# Patient Record
Sex: FEMALE | Race: WHITE | NOT HISPANIC OR LATINO | Employment: OTHER | URBAN - METROPOLITAN AREA
[De-identification: names, ages, dates, MRNs, and addresses within clinical notes are randomized per-mention and may not be internally consistent; named-entity substitution may affect disease eponyms.]

---

## 2017-06-30 ENCOUNTER — ALLSCRIPTS OFFICE VISIT (OUTPATIENT)
Dept: OTHER | Facility: OTHER | Age: 82
End: 2017-06-30

## 2018-01-12 ENCOUNTER — GENERIC CONVERSION - ENCOUNTER (OUTPATIENT)
Dept: OTHER | Facility: OTHER | Age: 83
End: 2018-01-12

## 2018-01-12 ENCOUNTER — ALLSCRIPTS OFFICE VISIT (OUTPATIENT)
Dept: OTHER | Facility: OTHER | Age: 83
End: 2018-01-12

## 2018-01-13 NOTE — PROGRESS NOTES
Assessment   1  Actinic keratosis (702 0) (L57 0)   2  Screening for skin condition (V82 0) (Z13 89)   3  Seborrheic keratosis (702 19) (L82 1)   4  History of nonmelanoma skin cancer (V10 83) (Z85 828)    Plan    · Wound care as instructed ; Status:Complete;   Done: [id number]   · Follow-up visit in 6 months Evaluation and Treatment  Follow-up  Status: Hold For -    Scheduling  Requested for: 12Jan2018    Discussion/Summary   Discussion Summary- [facility]:      Assessment #1: Screening for dermatologic disorders  Care Plan:      Nothing else of note seen on complete exam followup in 6 months  Assessment #2: History of skin cancer  Care Plan:      No recurrence nothing else atypical sunblock recommended followup in 6 months  Assessment #3: Seborrheic keratosis  Care Plan:      Patient reassured these are normal growths we acquire with age no treatment needed  Assessment #4: Actinic keratosis  Care Plan:      Patient advised lesions are precancers should resolve with cryosurgery if not to let us know sunblock recommended  Chief Complaint   Chief Complaint Free Text Note Form: Six-month checkup      History of Present Illness   HPI: 8-year-old female with previous history of skin cancer presents for overall skin check concerned regarding a scaly spot on her right cheek again we had previously frozen in that area      Review of Systems   Complete Female Dermatology [facility] Torres Patient:      Constitutional: Denies constitutional symptoms  Eyes: Denies eye symptoms  ENT:  denies ear symptoms, nasal symptoms, mouth or throat symptoms  Cardiovascular: Denies cardiovascular symptoms  Respiratory: Denies respiratory symptoms  Gastrointestinal: Denies gastrointestinal symptoms  Musculoskeletal: Denies musculoskeletal symptoms  Integumentary: Denies skin, hair and nail symptoms  Neurological: Denies neurologic symptoms        Psychiatric: Denies psychiatric symptoms  Endocrine: Denies endocrine symptoms  Hematologic/Lymphatic: Denies hematologic symptoms  Active Problems   1  Actinic keratosis (702 0) (L57 0)   2  Malignant neoplasm of skin (173 90) (C44 90)   3  Nonhealing ulcer of right lower leg (707 19) (L97 919)   4  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)   5  Screening for skin condition (V82 0) (Z13 89)   6  Seborrheic keratosis (702 19) (L82 1)    Past Medical History   1  History of nonmelanoma skin cancer (V10 83) (Z85 828)   2  History of seborrheic keratosis (V13 3) (Z87 2)   3  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)   4  History of Malignant Neoplasm Of Skin Of Face (173 30)   5  History of Skin Neoplasm Of The Trunk (239 2)  Past Medical History Reviewed- Derm:    The past medical history was reviewed  Surgical History   1  History of Cataract Surgery   2  History of Cholecystectomy   3  History of Hernia Repair   4  History of Hysterectomy   5  History of Open Fracture Reduction   6  History of Shaving Of Lesion Face   7  History of Shaving Of Lesion Fingers   8  History of Shaving Of Lesion Hands   9  History of Shaving Of Lesion Nose   10  History of Small Bowel Resection   11  History of Treatment Of Femoral Fracture  Surgical History Reviewed Annabella Garretson- Derm:    Surgical History reviewed      Family History   Mother    1  Family history of Breast CA  Family History Reviewed- Derm:    Family History was reviewed      Social History    · Former smoker (P31 49) (S29 252)  Social History Reviewed Annabella Garretson- Derm: The social history was reviewed      Current Meds    1  AmLODIPine Besylate 10 MG Oral Tablet Recorded   2  Bandar Low Dose 81 MG Oral Tablet Delayed Release; Therapy: [id number] to Recorded   3  Caltrate 600 TABS; Therapy: (Recorded:[id number]) to Recorded   4  Geritol Complete Oral Tablet; Therapy: [id number] to Recorded   5  HydroCHLOROthiazide 25 MG Oral Tablet;      Therapy: (Recorded:[id number]) to Recorded   6  Multi-Vitamin TABS; Therapy: (Recorded:22Ghi2605) to Recorded   7  PreserVision AREDS CAPS; Therapy: ([id number]) to Recorded  Medication List Reviewed: The medication list was reviewed and updated today  Allergies   1  No Known Drug Allergies    Physical Exam        Constitutional      General appearance: Appears healthy and well developed  Lymphatic      No visible disturbance  Musculoskeletal      Digits and nails: No clubbing, cyanosis or edema  Cutaneous and nail exam normal        Skin      Scalp skin texture and hair distribution: Normal skin texture on scalp, normal hair distribution  Head: Abnormal        Neck: Normal turgor, no rashes, no lesions  Chest: Normal turgor, no rashes, no lesions  Abdomen: Normal turgor, no rashes, no lesions  Back: Normal turgor, no rashes, no lesions  Right upper extremity: Normal turgor, no rashes, no lesions  Left upper extremity: Normal turgor, no rashes, no lesions  Right lower extremity: Normal turgor, no rashes, no lesions  Left lower extremity: Normal turgor, no rashes, no lesions  Examination for skin lesions: Abnormal   Skin Lesions: Actinic Keratosis: on area number 1 on the diagram           Neuro/Psych      Alert and oriented x 3  Displays comfort and cooperation during encounterl  Affect is normal        Finding Scaling erythematous areas noted above normal keratotic papules with greasy stuck on appearance previous sites of skin cancer well healed without recurrence nothing else atypical noted on exam       Procedure        Procedure: destruction of lesion  Indications for the procedure include actinic keratosis  Risks, benefits, alternatives, infection risk, bleeding risk, risk of allergic reaction and the risk of scarring were discussed with the patient--   verbal consent was obtained prior to the procedure      Procedure Note:      The lesion location: See Map  Destruction Technique: cryotherapy with liquid nitrogen application-- and-- 81-72 seconds via cryospray--   Destruction of 1 lesions  Post-Procedure:      Patient Status: the patient tolerated the procedure well  Complications: there were no complications      Procedure Note:    Post-Procedure:      Signatures    Electronically signed by : JOAQUIM Baez ; Jan 12 2018 10:58AM EST                       (Author)

## 2018-08-27 ENCOUNTER — OFFICE VISIT (OUTPATIENT)
Dept: DERMATOLOGY | Facility: CLINIC | Age: 83
End: 2018-08-27
Payer: MEDICARE

## 2018-08-27 DIAGNOSIS — L82.1 SEBORRHEIC KERATOSIS: ICD-10-CM

## 2018-08-27 DIAGNOSIS — Z13.89 SCREENING FOR SKIN CONDITION: ICD-10-CM

## 2018-08-27 DIAGNOSIS — Z85.828 HISTORY OF SKIN CANCER: ICD-10-CM

## 2018-08-27 DIAGNOSIS — L81.9 CHANGING PIGMENTED SKIN LESION: Primary | ICD-10-CM

## 2018-08-27 PROCEDURE — 88342 IMHCHEM/IMCYTCHM 1ST ANTB: CPT | Performed by: PATHOLOGY

## 2018-08-27 PROCEDURE — 88305 TISSUE EXAM BY PATHOLOGIST: CPT | Performed by: PATHOLOGY

## 2018-08-27 PROCEDURE — 99213 OFFICE O/P EST LOW 20 MIN: CPT | Performed by: DERMATOLOGY

## 2018-08-27 PROCEDURE — 11100 PR BIOPSY OF SKIN LESION: CPT | Performed by: DERMATOLOGY

## 2018-08-27 RX ORDER — AMLODIPINE BESYLATE 2.5 MG/1
TABLET ORAL
COMMUNITY

## 2018-08-27 RX ORDER — HYDROCHLOROTHIAZIDE 25 MG/1
TABLET ORAL
Refills: 0 | COMMUNITY
Start: 2018-07-09

## 2018-08-27 RX ORDER — VIT A/VIT C/VIT E/ZINC/COPPER 4296-226
CAPSULE ORAL
COMMUNITY

## 2018-08-27 NOTE — PATIENT INSTRUCTIONS
changing pigmented lesion await results of biopsy hopefully will not require any further treatment  Seborrheic keratosis patient reassured these are normal growths we acquire with age no treatment needed  History of skin cancer in no recurrence nothing else atypical sunblock recommended follow-up in 6 months  Screening for dermatologic disorders nothing else of concern noted on complete exam follow-up in 6 months

## 2018-08-27 NOTE — PROGRESS NOTES
[address] [facility] DERMATOLOGY  [address]  [facility] [address]  [phone]  [phone]     MRN: [id number] : 1915  Encounter: [id number]  Patient Information: Hazel Castro  Chief complaint:6 month skin check    History of present illness:  8-year-old female presents secondary to concerns regarding potential skin cancer  The patient concerned regarding new lesion on the back also concerned regarding lesion on the neck we had discussed lesion we noted on the right lower leg she is not aware of  No past medical history on file  No past surgical history on file  Social History   History   Alcohol use Not on file     History   Drug use: Unknown     History   Smoking Status    Never Smoker   Smokeless Tobacco    Never Used     No family history on file  Meds/Allergies   Allergies   Allergen Reactions    Lisinopril Cough       Meds:  Prior to Admission medications    Medication Sig Start Date End Date Taking? Authorizing Provider   amLODIPine (NORVASC) 2 5 mg tablet amlodipine 2 5 mg tablet   Yes Historical Provider, MD   aspirin 81 MG tablet daily   8/3/16  Yes Historical Provider, MD   Calcium Carbonate (CALTRATE 600) 1500 (600 Ca) MG TABS Take by mouth   Yes Historical Provider, MD   hydrochlorothiazide (HYDRODIURIL) 25 mg tablet TK 1 T PO  D 18  Yes Historical Provider, MD   Iron-Vitamins (GERITOL COMPLETE PO) Take by mouth 17  Yes Historical Provider, MD   Multiple Vitamins-Minerals (MULTIVITAMIN ADULT PO) 1 tab(s)   Yes Historical Provider, MD   Multiple Vitamins-Minerals (PRESERVISION AREDS) CAPS Take by mouth   Yes Historical Provider, MD       Subjective:     Review of Systems:    General: negative for - chills, fatigue, fever,  weight gain or weight loss  Psychological: negative for - anxiety, behavioral disorder, concentration difficulties, decreased libido, depression, irritability, memory difficulties, mood swings, sleep disturbances or suicidal ideation  ENT: negative for - hearing difficulties , nasal congestion, nasal discharge, oral lesions, sinus pain, sneezing, sore throat  Allergy and Immunology: negative for - hives, insect bite sensitivity,  Hematological and Lymphatic: negative for - bleeding problems, blood clots,bruising, swollen lymph nodes  Endocrine: negative for - hair pattern changes, hot flashes, malaise/lethargy, mood swings, palpitations, polydipsia/polyuria, skin changes, temperature intolerance or unexpected weight change  Respiratory: negative for - cough, hemoptysis, orthopnea, shortness of breath, or wheezing  Cardiovascular: negative for - chest pain, dyspnea on exertion, edema,  Gastrointestinal: negative for - abdominal pain, nausea/vomiting  Genito-Urinary: negative for - dysuria, incontinence, irregular/heavy menses or urinary frequency/urgency  Musculoskeletal: negative for - gait disturbance, joint pain, joint stiffness, joint swelling, muscle pain, muscular weakness  Dermatological:  As in HPI  Neurological: negative for confusion, dizziness, headaches, impaired coordination/balance, memory loss, numbness/tingling, seizures, speech problems, tremors or weakness       Objective: There were no vitals taken for this visit      Physical Exam:    General Appearance:    Alert, cooperative, no distress   Head:    Normocephalic, without obvious abnormality, atraumatic           Skin:   A full skin exam was performed including scalp, head scalp, eyes, ears, nose, lips, neck, chest, axilla, abdomen, back, buttocks, bilateral upper extremities, bilateral lower extremities, hands, feet, fingers, toes, fingernails, and toenails  Previous site of skin cancers well healed without recurrence normal keratotic papules with greasy stuck on appearance 4 mm pigmented macule right lower leg with some irregular pigmentation nothing else remarkable noted on complete exam         Shave Biopsy Procedure Note    Pre-operative Diagnosis: lentigo rule out atypia    Plan:  1  Instructed to keep the wound dry and covered for 24 and clean thereafter  2  Warning signs of infection were reviewed  3  Recommended that the patient use OTC acetaminophen as needed for pain  4  Return  Pending results of biopsy(ies)    Locations: right lower leg    Indications:  Suspicious lesions    Anesthesia: Lidocaine 1% without epinephrine without added sodium bicarbonate    Procedure Details     Patient informed of the risks (including bleeding and infection) and benefits of the   procedure and Verbal informed consent obtained  The lesion and surrounding area were given a sterile prep using alcohol and draped in the usual sterile fashion  A Blue blade razor was used to obtain a specimen  Hemostasis achieved with aluminum chloride  Petrolatum and a sterile dressing applied  The specimen was sent for pathologic examination  The patient tolerated the procedure(s) well  Complications:  none  Assessment:     1  Changing pigmented skin lesion     2  Seborrheic keratosis     3  Screening for skin condition     4  History of skin cancer           Plan:    changing pigmented lesion await results of biopsy hopefully will not require any further treatment  Seborrheic keratosis patient reassured these are normal growths we acquire with age no treatment needed  History of skin cancer in no recurrence nothing else atypical sunblock recommended follow-up in 6 months  Screening for dermatologic disorders nothing else of concern noted on complete exam follow-up in 6 months    Deb Garrison MD  8/27/2018,11:28 AM    Portions of the record may have been created with voice recognition software   Occasional wrong word or "sound a like" substitutions may have occurred due to the inherent limitations of voice recognition software   Read the chart carefully and recognize, using context, where substitutions have occurred

## 2018-10-29 ENCOUNTER — PROCEDURE VISIT (OUTPATIENT)
Dept: DERMATOLOGY | Facility: CLINIC | Age: 83
End: 2018-10-29
Payer: MEDICARE

## 2018-10-29 DIAGNOSIS — L81.9 ATYPICAL PIGMENTED SKIN LESION: Primary | ICD-10-CM

## 2018-10-29 PROCEDURE — 88313 SPECIAL STAINS GROUP 2: CPT | Performed by: PATHOLOGY

## 2018-10-29 PROCEDURE — 11602 EXC TR-EXT MAL+MARG 1.1-2 CM: CPT | Performed by: DERMATOLOGY

## 2018-10-29 PROCEDURE — 88305 TISSUE EXAM BY PATHOLOGIST: CPT | Performed by: PATHOLOGY

## 2018-10-29 NOTE — PROGRESS NOTES
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     Objective: There were no vitals taken for this visit  Physical Exam:    General Appearance:    Alert, cooperative, no distress   Skin:   Previous noted site with lentigo and atypia pigmentation larger than noted at the 1 edge now measures about 1 3 cm in size    Procedure name: Excision       Location:  Right shin    Diagnosis:  Atypical melanocytic lesion bordering on melanoma in situ    Size of lesion: 13 mm    Margins: 2 mm    Size + Margins: 17 mm    I explained the diagnosis to the patient and we recommend an excision of the lesion for diagnosis and/or treatment  Potential complications include, but are not limited to: scarring, bleeding, infection, incomplete removal, recurrence, and nerve damage  The risks, benefits, and alternatives were discussed with the patient in detail  The location of the tumor was identified, circled, and confirmed by the patient  The correct patient, site, and procedure were confirmed  Anesthesia: 1% xylocaine with 1:100,000 epinephrine 6 cc  The patient was brought into the room, prepped and draped sterilely in the usual manner and anesthesia was administered by local infiltration  A fusiform shape was drawn around the lesion, and the margins were incised to the level of the subcutaneous fat with a number 15cc Bard-Jef blade  The tissue was removed with sharp and blunt dissection  The lateral margins of the resulting defect were undermined with sharp and blunt dissection  Hemostasis was achieved with electrocautery    We were unable to close this primarily without her skin tearing we elected therefore to go ahead and just allow the area to heal by secondary intention  Vaseline gauze was placed over the wound defect along with the DuoDerm and pressure dressing      Final wound length: 3 4cm    Follow-up in: 2 days  Patient tolerated procedure well without complications  Assessment:     1  Atypical pigmented skin lesion           Plan:   Wound care instructions given to patient        Prior to Admission medications    Medication Sig Start Date End Date Taking? Authorizing Provider   amLODIPine (NORVASC) 2 5 mg tablet amlodipine 2 5 mg tablet   Yes Historical Provider, MD   Calcium Carbonate (CALTRATE 600) 1500 (600 Ca) MG TABS Take by mouth   Yes Historical Provider, MD   hydrochlorothiazide (HYDRODIURIL) 25 mg tablet TK 1 T PO  D 7/9/18  Yes Historical Provider, MD   Iron-Vitamins (GERITOL COMPLETE PO) Take by mouth 6/30/17  Yes Historical Provider, MD   Multiple Vitamins-Minerals (MULTIVITAMIN ADULT PO) 1 tab(s)   Yes Historical Provider, MD   Multiple Vitamins-Minerals (PRESERVISION AREDS) CAPS Take by mouth   Yes Historical Provider, MD   aspirin 81 MG tablet daily  8/3/16   Historical Provider, MD     Allergies   Allergen Reactions    Lisinopril Cough       Huyen Borrego MD  10/29/2018,10:29 AM    Portions of the record may have been created with voice recognition software   Occasional wrong word or "sound a like" substitutions may have occurred due to the inherent limitations of voice recognition software   Read the chart carefully and recognize, using context, where substitutions have occurred

## 2018-10-31 ENCOUNTER — CLINICAL SUPPORT (OUTPATIENT)
Dept: DERMATOLOGY | Facility: CLINIC | Age: 83
End: 2018-10-31

## 2018-10-31 DIAGNOSIS — L81.9 CHANGING PIGMENTED SKIN LESION: Primary | ICD-10-CM

## 2018-10-31 PROCEDURE — 99024 POSTOP FOLLOW-UP VISIT: CPT | Performed by: DERMATOLOGY

## 2018-10-31 NOTE — PROGRESS NOTES
[facility] DERMATOLOGY  [address]  [facility] [address]  [phone]  [phone]     MRN: [id number] : 1915  Encounter: [id number]  Patient Information: Paul Duran    Subjective:     12-year-old female seen status post excision of atypical pigmented lesion noted on the right lower leg no problems noted     Objective: There were no vitals taken for this visit  Physical Exam:    General Appearance:    Alert, cooperative, no distress   Skin:   Previous site without any sign of infection no increased inflammation noted     Assessment:     1  Changing pigmented skin lesion           Plan:   Area cleansed new Vaseline gauze place along with DuoDerm will plan follow-up again on Tuesday allow the area to heal in by secondary intention      Prior to Admission medications    Medication Sig Start Date End Date Taking? Authorizing Provider   amLODIPine (NORVASC) 2 5 mg tablet amlodipine 2 5 mg tablet    Historical Provider, MD   aspirin 81 MG tablet daily  8/3/16   Historical Provider, MD   Calcium Carbonate (CALTRATE 600) 1500 (600 Ca) MG TABS Take by mouth    Historical Provider, MD   hydrochlorothiazide (HYDRODIURIL) 25 mg tablet TK 1 T PO  D 18   Historical Provider, MD   Iron-Vitamins (GERITOL COMPLETE PO) Take by mouth 17   Historical Provider, MD   Multiple Vitamins-Minerals (MULTIVITAMIN ADULT PO) 1 tab(s)    Historical Provider, MD   Multiple Vitamins-Minerals (PRESERVISION AREDS) CAPS Take by mouth    Historical Provider, MD     Allergies   Allergen Reactions    Lisinopril Cough       Nunu Prescott MD  10/31/2018,9:01 AM    Portions of the record may have been created with voice recognition software   Occasional wrong word or "sound a like" substitutions may have occurred due to the inherent limitations of voice recognition software   Read the chart carefully and recognize, using context, where substitutions have occurred

## 2018-10-31 NOTE — PATIENT INSTRUCTIONS
Area cleansed new Vaseline gauze place along with DuoDerm will plan follow-up again on Tuesday allow the area to heal in by secondary intention

## 2018-11-02 NOTE — PROGRESS NOTES
Please have this reviewed in conjunction with previous biopsy   As excision was performed under the recommendation of Dr Yanique Mart

## 2018-11-06 ENCOUNTER — CLINICAL SUPPORT (OUTPATIENT)
Dept: DERMATOLOGY | Facility: CLINIC | Age: 83
End: 2018-11-06

## 2018-11-06 DIAGNOSIS — L81.9 ATYPICAL PIGMENTED SKIN LESION: Primary | ICD-10-CM

## 2018-11-06 PROCEDURE — 99024 POSTOP FOLLOW-UP VISIT: CPT | Performed by: DERMATOLOGY

## 2018-11-06 NOTE — PROGRESS NOTES
[facility] DERMATOLOGY  [address] [place] [address]  [phone]  [phone]     MRN: [id number] : 1915  Encounter: [id number]  Patient Information: Reji Sher    Subjective:     8-year-old female presents for follow-up secondary to the atypical pigmented lesion and healing by secondary intention     Objective: There were no vitals taken for this visit  Physical Exam:    General Appearance:    Alert, cooperative, no distress   Skin:   Previous site of excision with some granulation tissue noted marked erythema noted around the wound site with swelling  No sign of any infection     Assessment:     1  Atypical pigmented skin lesion           Plan:   Previous site of excision with a lot a reactive erythema advised try to keep the area elevated as much as possible again apply DuoDerm and will recheck on Monday      Prior to Admission medications    Medication Sig Start Date End Date Taking? Authorizing Provider   amLODIPine (NORVASC) 2 5 mg tablet amlodipine 2 5 mg tablet    Historical Provider, MD   aspirin 81 MG tablet daily   8/3/16   Historical Provider, MD   Calcium Carbonate (CALTRATE 600) 1500 (600 Ca) MG TABS Take by mouth    Historical Provider, MD   hydrochlorothiazide (HYDRODIURIL) 25 mg tablet TK 1 T PO  D 18   Historical Provider, MD   Iron-Vitamins (GERITOL COMPLETE PO) Take by mouth 17   Historical Provider, MD   Multiple Vitamins-Minerals (MULTIVITAMIN ADULT PO) 1 tab(s)    Historical Provider, MD   Multiple Vitamins-Minerals (PRESERVISION AREDS) CAPS Take by mouth    Historical Provider, MD     Allergies   Allergen Reactions    Lisinopril Cough       Jazlyn Tucker MD  2018,11:32 AM    Portions of the record may have been created with voice recognition software   Occasional wrong word or "sound a like" substitutions may have occurred due to the inherent limitations of voice recognition software   Read the chart carefully and recognize, using context, where substitutions have occurred

## 2018-11-06 NOTE — PATIENT INSTRUCTIONS
Previous site of excision with a lot a reactive erythema advised try to keep the area elevated as much as possible again apply DuoDerm and will recheck on Monday

## 2018-11-12 ENCOUNTER — CLINICAL SUPPORT (OUTPATIENT)
Dept: DERMATOLOGY | Facility: CLINIC | Age: 83
End: 2018-11-12
Payer: MEDICARE

## 2018-11-12 ENCOUNTER — TELEPHONE (OUTPATIENT)
Dept: DERMATOLOGY | Facility: CLINIC | Age: 83
End: 2018-11-12

## 2018-11-12 DIAGNOSIS — L81.9 ATYPICAL PIGMENTED SKIN LESION: Primary | ICD-10-CM

## 2018-11-12 PROCEDURE — 29580 STRAPPING UNNA BOOT: CPT | Performed by: DERMATOLOGY

## 2018-11-12 PROCEDURE — 99024 POSTOP FOLLOW-UP VISIT: CPT | Performed by: DERMATOLOGY

## 2018-11-12 NOTE — PROGRESS NOTES
[address] [facility] DERMATOLOGY  [address] [facility] [id number] [phone]     MRN: [id number] : 1915  Encounter: [id number]  Patient Information: Luz Cox    Subjective:     8-year-old female presents for follow-up for previously excised atypical melanocytic lesion right lower leg and healing by secondary intention     Objective: There were no vitals taken for this visit  Physical Exam:    General Appearance:    Alert, cooperative, no distress   Skin:   Previous site of excision with granulation tissue swelling and edema noted around the area with some erythema no sign of any infection     Assessment:     1  Atypical pigmented skin lesion           Plan:   Wound cleansed with saline DuoDerm Unna boot and Coban dressing applied will plan follow-up again in 1 week       Prior to Admission medications    Medication Sig Start Date End Date Taking? Authorizing Provider   amLODIPine (NORVASC) 2 5 mg tablet amlodipine 2 5 mg tablet    Historical Provider, MD   aspirin 81 MG tablet daily   8/3/16   Historical Provider, MD   Calcium Carbonate (CALTRATE 600) 1500 (600 Ca) MG TABS Take by mouth    Historical Provider, MD   hydrochlorothiazide (HYDRODIURIL) 25 mg tablet TK 1 T PO  D 18   Historical Provider, MD   Iron-Vitamins (GERITOL COMPLETE PO) Take by mouth 17   Historical Provider, MD   Multiple Vitamins-Minerals (MULTIVITAMIN ADULT PO) 1 tab(s)    Historical Provider, MD   Multiple Vitamins-Minerals (PRESERVISION AREDS) CAPS Take by mouth    Historical Provider, MD     Allergies   Allergen Reactions    Lisinopril Cough       Yonathan Plascencia MD  2018,2:43 PM    Portions of the record may have been created with voice recognition software   Occasional wrong word or "sound a like" substitutions may have occurred due to the inherent limitations of voice recognition software   Read the chart carefully and recognize, using context, where substitutions have occurred

## 2018-11-12 NOTE — PATIENT INSTRUCTIONS
Wound cleansed with saline DuoDerm Unna boot and Coban dressing applied will plan follow-up again in 1 week

## 2018-11-19 ENCOUNTER — CLINICAL SUPPORT (OUTPATIENT)
Dept: DERMATOLOGY | Facility: CLINIC | Age: 83
End: 2018-11-19

## 2018-11-19 DIAGNOSIS — L81.9 ATYPICAL PIGMENTED SKIN LESION: Primary | ICD-10-CM

## 2018-11-19 PROCEDURE — 99024 POSTOP FOLLOW-UP VISIT: CPT | Performed by: DERMATOLOGY

## 2018-11-19 NOTE — PROGRESS NOTES
[facility]  [address] [place] [address]  [phone]  [phone]     MRN: [id number] : 1915  Encounter: [id number]  Patient Information: Jcarlos May    Subjective:     8-year-old female presents for follow-up secondary to the atypical pigmented lesion and healing by secondary intention     Objective: There were no vitals taken for this visit  Physical Exam:    General Appearance:    Alert, cooperative, no distress   Skin:   Previous site of excision shows good granulation tissue sign of infection     Assessment:     1  Atypical pigmented skin lesion           Plan:   Area healing well again apply DuoDerm Unna boot and Coban dressing and follow-up in 1 week      Prior to Admission medications    Medication Sig Start Date End Date Taking? Authorizing Provider   amLODIPine (NORVASC) 2 5 mg tablet amlodipine 2 5 mg tablet    Historical Provider, MD   aspirin 81 MG tablet daily  8/3/16   Historical Provider, MD   Calcium Carbonate (CALTRATE 600) 1500 (600 Ca) MG TABS Take by mouth    Historical Provider, MD   hydrochlorothiazide (HYDRODIURIL) 25 mg tablet TK 1 T PO  D 18   Historical Provider, MD   Iron-Vitamins (GERITOL COMPLETE PO) Take by mouth 17   Historical Provider, MD   Multiple Vitamins-Minerals (MULTIVITAMIN ADULT PO) 1 tab(s)    Historical Provider, MD   Multiple Vitamins-Minerals (PRESERVISION AREDS) CAPS Take by mouth    Historical Provider, MD     Allergies   Allergen Reactions    Lisinopril Cough       Kisha Toussaint MD  2018,2:53 PM    Portions of the record may have been created with voice recognition software   Occasional wrong word or "sound a like" substitutions may have occurred due to the inherent limitations of voice recognition software   Read the chart carefully and recognize, using context, where substitutions have occurred

## 2018-11-27 ENCOUNTER — CLINICAL SUPPORT (OUTPATIENT)
Dept: DERMATOLOGY | Facility: CLINIC | Age: 83
End: 2018-11-27
Payer: MEDICARE

## 2018-11-27 DIAGNOSIS — L81.9 ATYPICAL PIGMENTED SKIN LESION: Primary | ICD-10-CM

## 2018-11-27 PROCEDURE — 29580 STRAPPING UNNA BOOT: CPT | Performed by: DERMATOLOGY

## 2018-11-27 PROCEDURE — 99024 POSTOP FOLLOW-UP VISIT: CPT | Performed by: DERMATOLOGY

## 2018-11-27 NOTE — PATIENT INSTRUCTIONS
Area cleansed with saline DuoDerm Unna boot applied along with a Coban dressing will see if we can get a visiting nurse to continue treatment

## 2018-11-27 NOTE — PROGRESS NOTES
[address]  [address] [facility] [address]  [phone]  [phone]     MRN: [id number] : 1915  Encounter: [id number]  Patient Information: Carly Cain    Subjective:     8-year-old female presents for follow-up secondary to previously excised atypical pigmented skin lesion right lower leg and healing by secondary intention area appears to be healing well however it is getting more difficult to bring the patient and with the cold weather     Objective: There were no vitals taken for this visit  Physical Exam:    General Appearance:    Alert, cooperative, no distress   Skin:   Erosion has granulated in nicely with some epithelialization noted     Assessment:     1  Atypical pigmented skin lesion           Plan:   Area cleansed with saline DuoDerm Unna boot applied along with a Coban dressing will see if we can get a visiting nurse to continue treatment  Prior to Admission medications    Medication Sig Start Date End Date Taking? Authorizing Provider   amLODIPine (NORVASC) 2 5 mg tablet amlodipine 2 5 mg tablet   Yes Historical Provider, MD   Calcium Carbonate (CALTRATE 600) 1500 (600 Ca) MG TABS Take by mouth   Yes Historical Provider, MD   hydrochlorothiazide (HYDRODIURIL) 25 mg tablet TK 1 T PO  D 18  Yes Historical Provider, MD   Iron-Vitamins (GERITOL COMPLETE PO) Take by mouth 17  Yes Historical Provider, MD   Multiple Vitamins-Minerals (MULTIVITAMIN ADULT PO) 1 tab(s)   Yes Historical Provider, MD   Multiple Vitamins-Minerals (PRESERVISION AREDS) CAPS Take by mouth   Yes Historical Provider, MD   aspirin 81 MG tablet daily   8/3/16   Historical Provider, MD     Allergies   Allergen Reactions    Lisinopril Cough       Stacia Victor MD  2018,2:54 PM    Portions of the record may have been created with voice recognition software   Occasional wrong word or "sound a like" substitutions may have occurred due to the inherent limitations of voice recognition software   Read the chart carefully and recognize, using context, where substitutions have occurred

## 2018-12-03 ENCOUNTER — CLINICAL SUPPORT (OUTPATIENT)
Dept: DERMATOLOGY | Facility: CLINIC | Age: 83
End: 2018-12-03
Payer: MEDICARE

## 2018-12-03 DIAGNOSIS — L81.9 ATYPICAL PIGMENTED SKIN LESION: Primary | ICD-10-CM

## 2018-12-03 PROCEDURE — 29580 STRAPPING UNNA BOOT: CPT | Performed by: DERMATOLOGY

## 2018-12-03 NOTE — PROGRESS NOTES
[address]  [address]  [facility] [address]  [phone]  [phone]     MRN: [id number] : 1915  Encounter: [id number]  Patient Information: Paul Duran    Subjective:     8-year-old female presents for follow-up for previously excised atypical pigmented lesion right lower leg with healing by secondary intention     Objective: There were no vitals taken for this visit  Physical Exam:    General Appearance:    Alert, cooperative, no distress   Skin:   Previous site of excision with hypergranulation tissue this is a lot of drainage noted no sign of infection     Assessment:     1  Atypical pigmented skin lesion           Plan:   Since there is excess granulation tissue will go ahead and place silver nitrate cauterization switch to Allevyn dressing the Unna boot and Coban dressing at follow-up in 1 week      Prior to Admission medications    Medication Sig Start Date End Date Taking? Authorizing Provider   amLODIPine (NORVASC) 2 5 mg tablet amlodipine 2 5 mg tablet    Historical Provider, MD   aspirin 81 MG tablet daily   8/3/16   Historical Provider, MD   Calcium Carbonate (CALTRATE 600) 1500 (600 Ca) MG TABS Take by mouth    Historical Provider, MD   hydrochlorothiazide (HYDRODIURIL) 25 mg tablet TK 1 T PO  D 18   Historical Provider, MD   Iron-Vitamins (GERITOL COMPLETE PO) Take by mouth 17   Historical Provider, MD   Multiple Vitamins-Minerals (MULTIVITAMIN ADULT PO) 1 tab(s)    Historical Provider, MD   Multiple Vitamins-Minerals (PRESERVISION AREDS) CAPS Take by mouth    Historical Provider, MD     Allergies   Allergen Reactions    Lisinopril Cough       Nunu Prescott MD  12/3/2018,3:05 PM    Portions of the record may have been created with voice recognition software   Occasional wrong word or "sound a like" substitutions may have occurred due to the inherent limitations of voice recognition software   Read the chart carefully and recognize, using context, where substitutions have occurred

## 2018-12-03 NOTE — PATIENT INSTRUCTIONS
Since there is excess granulation tissue will go ahead and place silver nitrate cauterization switch to Allevyn dressing the Unna boot and Coban dressing at follow-up in 1 week

## 2018-12-10 ENCOUNTER — CLINICAL SUPPORT (OUTPATIENT)
Dept: DERMATOLOGY | Facility: CLINIC | Age: 83
End: 2018-12-10
Payer: MEDICARE

## 2018-12-10 DIAGNOSIS — L81.9 ATYPICAL PIGMENTED SKIN LESION: Primary | ICD-10-CM

## 2018-12-10 PROCEDURE — 29580 STRAPPING UNNA BOOT: CPT | Performed by: DERMATOLOGY

## 2018-12-10 NOTE — PATIENT INSTRUCTIONS
Silver nitrate sticks were used for cauterization of the granulation tissue Allevyn Unna boot and Coban dressing applied follow-up in 1 week

## 2018-12-10 NOTE — PROGRESS NOTES
[address]  [address] [place] [address]  [phone]  [phone]     MRN: [id number] : 1915  Encounter: [id number]  Patient Information: [facility]    Subjective:     8-year-old female presents for follow-up for previously excised atypical pigmented lesion on the right lower leg and healing by secondary intention     Objective: There were no vitals taken for this visit  Physical Exam:    General Appearance:    Alert, cooperative, no distress   Skin:   Previous site of excision with hypertrophic granulation tissue noted with some epithelialization     Assessment:     1  Atypical pigmented skin lesion           Plan:   Silver nitrate sticks were used for cauterization of the granulation tissue Allevyn Unna boot and Coban dressing applied follow-up in 1 week      Prior to Admission medications    Medication Sig Start Date End Date Taking? Authorizing Provider   amLODIPine (NORVASC) 2 5 mg tablet amlodipine 2 5 mg tablet    Historical Provider, MD   aspirin 81 MG tablet daily   8/3/16   Historical Provider, MD   Calcium Carbonate (CALTRATE 600) 1500 (600 Ca) MG TABS Take by mouth    Historical Provider, MD   hydrochlorothiazide (HYDRODIURIL) 25 mg tablet TK 1 T PO  D 18   Historical Provider, MD   Iron-Vitamins (GERITOL COMPLETE PO) Take by mouth 17   Historical Provider, MD   Multiple Vitamins-Minerals (MULTIVITAMIN ADULT PO) 1 tab(s)    Historical Provider, MD   Multiple Vitamins-Minerals (PRESERVISION AREDS) CAPS Take by mouth    Historical Provider, MD     Allergies   Allergen Reactions    Lisinopril Cough       Marina Cardenas MD  12/10/2018,3:03 PM    Portions of the record may have been created with voice recognition software   Occasional wrong word or "sound a like" substitutions may have occurred due to the inherent limitations of voice recognition software   Read the chart carefully and recognize, using context, where substitutions have occurred

## 2018-12-18 ENCOUNTER — CLINICAL SUPPORT (OUTPATIENT)
Dept: DERMATOLOGY | Facility: CLINIC | Age: 83
End: 2018-12-18
Payer: MEDICARE

## 2018-12-18 DIAGNOSIS — L81.9 ATYPICAL PIGMENTED SKIN LESION: Primary | ICD-10-CM

## 2018-12-18 PROCEDURE — 29580 STRAPPING UNNA BOOT: CPT | Performed by: DERMATOLOGY

## 2018-12-18 NOTE — PATIENT INSTRUCTIONS
Area continues to see heal quickly we again applied an Allevyn dressing along with a Unna boot and Coban dressing and follow-up in 1 week

## 2018-12-18 NOTE — PROGRESS NOTES
[address]  [address]  [facility] [address]  [phone]     MRN: [id number] : 1915  Encounter: [id number]  Patient Information: Kathy Lynne    Subjective:     8-year-old female seen in follow-up secondary to previously excised atypical a pigmented lesion on the right lower leg with healing by secondary intention     Objective: There were no vitals taken for this visit  Physical Exam:    General Appearance:    Alert, cooperative, no distress   Skin:   Marked healing noted area still has a small erosion     Assessment:     1  Atypical pigmented skin lesion           Plan:   Area continues to see heal quickly we again applied an Allevyn dressing along with a Unna boot and Coban dressing and follow-up in 1 week      Prior to Admission medications    Medication Sig Start Date End Date Taking? Authorizing Provider   amLODIPine (NORVASC) 2 5 mg tablet amlodipine 2 5 mg tablet   Yes Historical Provider, MD   aspirin 81 MG tablet daily   8/3/16  Yes Historical Provider, MD   Calcium Carbonate (CALTRATE 600) 1500 (600 Ca) MG TABS Take by mouth   Yes Historical Provider, MD   hydrochlorothiazide (HYDRODIURIL) 25 mg tablet TK 1 T PO  D 18  Yes Historical Provider, MD   Iron-Vitamins (GERITOL COMPLETE PO) Take by mouth 17  Yes Historical Provider, MD   Multiple Vitamins-Minerals (MULTIVITAMIN ADULT PO) 1 tab(s)   Yes Historical Provider, MD   Multiple Vitamins-Minerals (PRESERVISION AREDS) CAPS Take by mouth   Yes Historical Provider, MD     Allergies   Allergen Reactions    Lisinopril Cough       Bartolo Neville MD  2018,3:03 PM    Portions of the record may have been created with voice recognition software   Occasional wrong word or "sound a like" substitutions may have occurred due to the inherent limitations of voice recognition software   Read the chart carefully and recognize, using context, where substitutions have occurred

## 2018-12-26 ENCOUNTER — CLINICAL SUPPORT (OUTPATIENT)
Dept: DERMATOLOGY | Facility: CLINIC | Age: 83
End: 2018-12-26
Payer: MEDICARE

## 2018-12-26 DIAGNOSIS — L81.9 ATYPICAL PIGMENTED SKIN LESION: Primary | ICD-10-CM

## 2018-12-26 PROCEDURE — 99212 OFFICE O/P EST SF 10 MIN: CPT | Performed by: DERMATOLOGY

## 2018-12-26 NOTE — PROGRESS NOTES
[facility]  [address] [place] [address]  [phone]     MRN: [id number] : 1915  Encounter: [id number]  Patient Information: Fabby Flynn    Subjective:     8-year-old female presents for previously excised atypical pigmented lesion right shin and healing by secondary intention     Objective: There were no vitals taken for this visit  Physical Exam:    General Appearance:    Alert, cooperative, no distress   Skin:   Previous site of excision completely healed     Assessment:     1  Atypical pigmented skin lesion           Plan:   Placed in Allevyn dressing over the area to protect the area for another week and recheck at that time      Prior to Admission medications    Medication Sig Start Date End Date Taking? Authorizing Provider   amLODIPine (NORVASC) 2 5 mg tablet amlodipine 2 5 mg tablet   Yes Historical Provider, MD   aspirin 81 MG tablet daily  8/3/16  Yes Historical Provider, MD   Calcium Carbonate (CALTRATE 600) 1500 (600 Ca) MG TABS Take by mouth   Yes Historical Provider, MD   hydrochlorothiazide (HYDRODIURIL) 25 mg tablet TK 1 T PO  D 18  Yes Historical Provider, MD   Iron-Vitamins (GERITOL COMPLETE PO) Take by mouth 17  Yes Historical Provider, MD   Multiple Vitamins-Minerals (MULTIVITAMIN ADULT PO) 1 tab(s)   Yes Historical Provider, MD   Multiple Vitamins-Minerals (PRESERVISION AREDS) CAPS Take by mouth   Yes Historical Provider, MD     Allergies   Allergen Reactions    Lisinopril Cough       Mariaelena Barnard MD  2018,3:00 PM    Portions of the record may have been created with voice recognition software   Occasional wrong word or "sound a like" substitutions may have occurred due to the inherent limitations of voice recognition software   Read the chart carefully and recognize, using context, where substitutions have occurred

## 2018-12-26 NOTE — PATIENT INSTRUCTIONS
Placed in Allevyn dressing over the area to protect the area for another week and recheck at that time

## 2019-01-02 ENCOUNTER — CLINICAL SUPPORT (OUTPATIENT)
Dept: DERMATOLOGY | Facility: CLINIC | Age: 84
End: 2019-01-02
Payer: MEDICARE

## 2019-01-02 DIAGNOSIS — L81.9 ATYPICAL PIGMENTED SKIN LESION: Primary | ICD-10-CM

## 2019-01-02 PROCEDURE — 99212 OFFICE O/P EST SF 10 MIN: CPT | Performed by: DERMATOLOGY

## 2019-01-02 NOTE — PATIENT INSTRUCTIONS
Question if the wound has gotten secondarily impetiginized will go ahead treat with Bactroban ointment and re-evaluate in a couple weeks

## 2019-01-02 NOTE — PROGRESS NOTES
[facility]  [address] [place] [address]  [phone]     MRN: [id number] : 1915  Encounter: [id number]  Patient Information: Refugio Rand    Subjective:     8-year-old female presents for follow-up secondary to previously excised atypical nevus healing by secondary intention last week the area looks pretty much healed however since that point the leg has become a little swollen     Objective: There were no vitals taken for this visit  Physical Exam:    General Appearance:    Alert, cooperative, no distress   Skin:   Small erosion crusting noted on the area with surrounding erythema     Assessment:     1  Atypical pigmented skin lesion           Plan:   Question if the wound has gotten secondarily impetiginized will go ahead treat with Bactroban ointment and re-evaluate in a couple weeks      Prior to Admission medications    Medication Sig Start Date End Date Taking? Authorizing Provider   amLODIPine (NORVASC) 2 5 mg tablet amlodipine 2 5 mg tablet   Yes Historical Provider, MD   aspirin 81 MG tablet daily   8/3/16  Yes Historical Provider, MD   Calcium Carbonate (CALTRATE 600) 1500 (600 Ca) MG TABS Take by mouth   Yes Historical Provider, MD   hydrochlorothiazide (HYDRODIURIL) 25 mg tablet TK 1 T PO  D 18  Yes Historical Provider, MD   Iron-Vitamins (GERITOL COMPLETE PO) Take by mouth 17  Yes Historical Provider, MD   Multiple Vitamins-Minerals (MULTIVITAMIN ADULT PO) 1 tab(s)   Yes Historical Provider, MD   Multiple Vitamins-Minerals (PRESERVISION AREDS) CAPS Take by mouth   Yes Historical Provider, MD     Allergies   Allergen Reactions    Lisinopril Cough       Freda Stern MD  2019,4:05 PM    Portions of the record may have been created with voice recognition software   Occasional wrong word or "sound a like" substitutions may have occurred due to the inherent limitations of voice recognition software   Read the chart carefully and recognize, using context, where substitutions have occurred

## 2019-01-16 ENCOUNTER — CLINICAL SUPPORT (OUTPATIENT)
Dept: DERMATOLOGY | Facility: CLINIC | Age: 84
End: 2019-01-16

## 2019-01-16 DIAGNOSIS — L81.9 ATYPICAL PIGMENTED SKIN LESION: Primary | ICD-10-CM

## 2019-01-16 PROCEDURE — 99024 POSTOP FOLLOW-UP VISIT: CPT | Performed by: DERMATOLOGY

## 2019-01-16 NOTE — PATIENT INSTRUCTIONS
Previous site of excision no longer looks secondarily infected small erosion still present patient is to continue with treatment with hydrogen peroxide,  petrolatum and a dressing    Patient started will contact me and 2-3 weeks if not resolved

## 2019-01-16 NOTE — PROGRESS NOTES
[facility]  [address]  [facility] [address]  [phone]     MRN: [id number] : 1915  Encounter: [id number]  Patient Information: Stas Luna    Subjective:     8-year-old female presents for follow-up for previously a excised atypical pigmented lesion lower leg with secondary bacterial infection which is been slow to heal recently     Objective: There were no vitals taken for this visit  Physical Exam:    General Appearance:    Alert, cooperative, no distress   Skin:   Area is slightly crusted with small erosion still present improved from previous     Assessment:     1  Atypical pigmented skin lesion           Plan:   Previous site of excision no longer looks secondarily infected small erosion still present patient is to continue with treatment with hydrogen peroxide,  petrolatum and a dressing  Patient started will contact me and 2-3 weeks if not resolved      Prior to Admission medications    Medication Sig Start Date End Date Taking? Authorizing Provider   amLODIPine (NORVASC) 2 5 mg tablet amlodipine 2 5 mg tablet    Historical Provider, MD   aspirin 81 MG tablet daily   8/3/16   Historical Provider, MD   Calcium Carbonate (CALTRATE 600) 1500 (600 Ca) MG TABS Take by mouth    Historical Provider, MD   hydrochlorothiazide (HYDRODIURIL) 25 mg tablet TK 1 T PO  D 18   Historical Provider, MD   Iron-Vitamins (GERITOL COMPLETE PO) Take by mouth 17   Historical Provider, MD   Multiple Vitamins-Minerals (MULTIVITAMIN ADULT PO) 1 tab(s)    Historical Provider, MD   Multiple Vitamins-Minerals (PRESERVISION AREDS) CAPS Take by mouth    Historical Provider, MD   mupirocin (BACTROBAN) 2 % ointment Apply topically 3 (three) times a day 19   Huyen Borrego MD     Allergies   Allergen Reactions    Lisinopril Cough       Huyen Borrego MD  2019,3:04 PM    Portions of the record may have been created with voice recognition software   Occasional wrong word or "sound a like" substitutions may have occurred due to the inherent limitations of voice recognition software   Read the chart carefully and recognize, using context, where substitutions have occurred

## 2019-03-29 ENCOUNTER — OFFICE VISIT (OUTPATIENT)
Dept: DERMATOLOGY | Facility: CLINIC | Age: 84
End: 2019-03-29
Payer: MEDICARE

## 2019-03-29 DIAGNOSIS — Z13.89 SCREENING FOR SKIN CONDITION: ICD-10-CM

## 2019-03-29 DIAGNOSIS — L82.1 SEBORRHEIC KERATOSIS: Primary | ICD-10-CM

## 2019-03-29 DIAGNOSIS — Z85.828 HISTORY OF SKIN CANCER: ICD-10-CM

## 2019-03-29 PROCEDURE — 99213 OFFICE O/P EST LOW 20 MIN: CPT | Performed by: DERMATOLOGY

## 2019-03-29 NOTE — PROGRESS NOTES
[facility]  [address] [place] [address]  [phone]  [phone]     MRN: [id number] : 1915  Encounter: [id number]  Patient Information: Maryjo Gates  Chief complaint:  Recheck for previously but excised atypical pigmented lesion on the lower leg borderline melanoma    History of present illness:  8-year-old female presents for overall checkup previous atypical nevus she excised and let heal by secondary intention no problems noted previous site well-healed nothing else annoying her at this time  History reviewed  No pertinent past medical history  History reviewed  No pertinent surgical history  Social History   Social History     Substance and Sexual Activity   Alcohol Use Not on file     Social History     Substance and Sexual Activity   Drug Use Not on file     Social History     Tobacco Use   Smoking Status Never Smoker   Smokeless Tobacco Never Used     History reviewed  No pertinent family history  Meds/Allergies   Allergies   Allergen Reactions    Lisinopril Cough       Meds:  Prior to Admission medications    Medication Sig Start Date End Date Taking? Authorizing Provider   amLODIPine (NORVASC) 2 5 mg tablet amlodipine 2 5 mg tablet   Yes Historical Provider, MD   Calcium Carbonate (CALTRATE 600) 1500 (600 Ca) MG TABS Take by mouth   Yes Historical Provider, MD   hydrochlorothiazide (HYDRODIURIL) 25 mg tablet TK 1 T PO  D 18  Yes Historical Provider, MD   Iron-Vitamins (GERITOL COMPLETE PO) Take by mouth 17  Yes Historical Provider, MD   Multiple Vitamins-Minerals (MULTIVITAMIN ADULT PO) 1 tab(s)   Yes Historical Provider, MD   Multiple Vitamins-Minerals (PRESERVISION AREDS) CAPS Take by mouth   Yes Historical Provider, MD   aspirin 81 MG tablet daily   8/3/16   Historical Provider, MD   mupirocin (BACTROBAN) 2 % ointment Apply topically 3 (three) times a day  Patient not taking: Reported on 3/29/2019 1/2/19   Harriet Doss MD [employer]       Subjective:     Review of Systems:    General: negative for - chills, fatigue, fever,  weight gain or weight loss  Psychological: negative for - anxiety, behavioral disorder, concentration difficulties, decreased libido, depression, irritability, memory difficulties, mood swings, sleep disturbances or suicidal ideation  ENT: negative for - hearing difficulties , nasal congestion, nasal discharge, oral lesions, sinus pain, sneezing, sore throat  Allergy and Immunology: negative for - hives, insect bite sensitivity,  Hematological and Lymphatic: negative for - bleeding problems, blood clots,bruising, swollen lymph nodes  Endocrine: negative for - hair pattern changes, hot flashes, malaise/lethargy, mood swings, palpitations, polydipsia/polyuria, skin changes, temperature intolerance or unexpected weight change  Respiratory: negative for - cough, hemoptysis, orthopnea, shortness of breath, or wheezing  Cardiovascular: negative for - chest pain, dyspnea on exertion, edema,  Gastrointestinal: negative for - abdominal pain, nausea/vomiting  Genito-Urinary: negative for - dysuria, incontinence, irregular/heavy menses or urinary frequency/urgency  Musculoskeletal: negative for - gait disturbance, joint pain, joint stiffness, joint swelling, muscle pain, muscular weakness  Dermatological:  As in HPI  Neurological: negative for confusion, dizziness, headaches, impaired coordination/balance, memory loss, numbness/tingling, seizures, speech problems, tremors or weakness       Objective: There were no vitals taken for this visit      Physical Exam:    General Appearance:    Alert, cooperative, no distress   Head:    Normocephalic, without obvious abnormality, atraumatic           Skin:   A full skin exam was performed including scalp, head scalp, eyes, ears, nose, lips, neck, chest, axilla, abdomen, back, buttocks, bilateral upper extremities, bilateral lower extremities, hands, feet, fingers, toes, fingernails, and toenails previous sites well-healed without any atypia normal keratotic papules with greasy stuck on appearance nothing else atypical noted exam     Assessment:     1  Seborrheic keratosis     2  Screening for skin condition     3  History of skin cancer           Plan:   Seborrheic keratosis patient reassured these are normal growths we acquire with age no treatment needed  History of skin cancer in no recurrence nothing else atypical sunblock recommended follow-up in 6 months  Screening for dermatologic disorders nothing else of concern noted on complete exam follow-up in 6 months    Mercy Bedolla MD  3/29/2019,10:07 AM    Portions of the record may have been created with voice recognition software   Occasional wrong word or "sound a like" substitutions may have occurred due to the inherent limitations of voice recognition software   Read the chart carefully and recognize, using context, where substitutions have occurred

## 2019-10-11 ENCOUNTER — OFFICE VISIT (OUTPATIENT)
Dept: DERMATOLOGY | Facility: CLINIC | Age: 84
End: 2019-10-11
Payer: MEDICARE

## 2019-10-11 DIAGNOSIS — L82.1 SEBORRHEIC KERATOSIS: Primary | ICD-10-CM

## 2019-10-11 DIAGNOSIS — Z13.89 SCREENING FOR SKIN CONDITION: ICD-10-CM

## 2019-10-11 DIAGNOSIS — Z85.828 HISTORY OF SKIN CANCER: ICD-10-CM

## 2019-10-11 PROCEDURE — 99213 OFFICE O/P EST LOW 20 MIN: CPT | Performed by: DERMATOLOGY

## 2019-10-11 RX ORDER — VIT C/E/CUPERIC/ZINC/LUTEIN 226-90-0.8
CAPSULE ORAL
COMMUNITY

## 2019-10-11 NOTE — PROGRESS NOTES
[facility] DERMATOLOGY  [address] [place] [address]  [facility] [id number]  [phone]  [phone]     MRN: [id number] : 1915  Encounter: [id number]  Patient Information: Tayler Montoya  Chief complaint:  Six-month checkup    History of present illness:  8-year-old female presents for overall checkup  Patient with recent atypical pigmented lesion on the right lower leg removed because of concerned that it was an a potential early melanoma no other concerns noted today except irritated area on the right cheek  History reviewed  No pertinent past medical history  History reviewed  No pertinent surgical history  Social History   Social History     Substance and Sexual Activity   Alcohol Use Not on file     Social History     Substance and Sexual Activity   Drug Use Not on file     Social History     Tobacco Use   Smoking Status Never Smoker   Smokeless Tobacco Never Used     History reviewed  No pertinent family history  Meds/Allergies   Allergies   Allergen Reactions    Lisinopril Cough       Meds:  Prior to Admission medications    Medication Sig Start Date End Date Taking? Authorizing Provider   amLODIPine (NORVASC) 2 5 mg tablet amlodipine 2 5 mg tablet   Yes Historical Provider, MD   Calcium Carbonate (CALTRATE 600) 1500 (600 Ca) MG TABS Take by mouth   Yes Historical Provider, MD   hydrochlorothiazide (HYDRODIURIL) 25 mg tablet TK 1 T PO  D 18  Yes Historical Provider, MD   Iron-Vitamins (GERITOL COMPLETE PO) Take by mouth 17  Yes Historical Provider, MD   Multiple Vitamins-Minerals (MULTIVITAMIN ADULT PO) 1 tab(s)   Yes Historical Provider, MD   Multiple Vitamins-Minerals (PRESERVISION AREDS) CAPS Take by mouth   Yes Historical Provider, MD   aspirin 81 MG tablet daily   8/3/16   Historical Provider, MD   Multiple Vitamins-Minerals (PRESERVISION/LUTEIN) CAPS 1 cap(s)    Historical Provider, MD   mupirocin (BACTROBAN) 2 % ointment Apply topically 3 (three) times a day  Patient not taking: Reported on 3/29/2019 1/2/19   Chelsey Mclain MD       Subjective:     Review of Systems:    General: negative for - chills, fatigue, fever,  weight gain or weight loss  Psychological: negative for - anxiety, behavioral disorder, concentration difficulties, decreased libido, depression, irritability, memory difficulties, mood swings, sleep disturbances or suicidal ideation  ENT: negative for - hearing difficulties , nasal congestion, nasal discharge, oral lesions, sinus pain, sneezing, sore throat  Allergy and Immunology: negative for - hives, insect bite sensitivity,  Hematological and Lymphatic: negative for - bleeding problems, blood clots,bruising, swollen lymph nodes  Endocrine: negative for - hair pattern changes, hot flashes, malaise/lethargy, mood swings, palpitations, polydipsia/polyuria, skin changes, temperature intolerance or unexpected weight change  Respiratory: negative for - cough, hemoptysis, orthopnea, shortness of breath, or wheezing  Cardiovascular: negative for - chest pain, dyspnea on exertion, edema,  Gastrointestinal: negative for - abdominal pain, nausea/vomiting  Genito-Urinary: negative for - dysuria, incontinence, irregular/heavy menses or urinary frequency/urgency  Musculoskeletal: negative for - gait disturbance, joint pain, joint stiffness, joint swelling, muscle pain, muscular weakness  Dermatological:  As in HPI  Neurological: negative for confusion, dizziness, headaches, impaired coordination/balance, memory loss, numbness/tingling, seizures, speech problems, tremors or weakness       Objective: There were no vitals taken for this visit      Physical Exam:    General Appearance:    Alert, cooperative, no distress   Head:    Normocephalic, without obvious abnormality, atraumatic           Skin:   A full skin exam was performed including scalp, head scalp, eyes, ears, nose, lips, neck, chest, axilla, abdomen, back, buttocks, bilateral upper extremities, bilateral lower extremities, hands, feet, fingers, toes, fingernails, and toenails erythematous scaling keratotic area noted on the right cheek them as 6 mm size nothing else remarkable on exam normal keratotic papules with greasy stuck on appearance previous site of atypical nevus well-healed without signs of any real local recurrence     Assessment:     1  Seborrheic keratosis     2  Screening for skin condition     3  History of skin cancer           Plan:   Seborrheic keratosis patient reassured these are normal growths we acquire with age no treatment needed  History of skin cancer in no recurrence nothing else atypical sunblock recommended follow-up p r n  Screening for dermatologic disorders nothing else of concern noted on complete exam follow-up in PRN lesion on the right cheek may be suspicious for an early actinic/squamous cell carcinoma if any further growth or concerns raised the let me know  Fidencio Bruno MD  10/11/2019,10:11 AM    Portions of the record may have been created with voice recognition software   Occasional wrong word or "sound a like" substitutions may have occurred due to the inherent limitations of voice recognition software   Read the chart carefully and recognize, using context, where substitutions have occurred

## 2019-10-11 NOTE — PATIENT INSTRUCTIONS
Seborrheic keratosis patient reassured these are normal growths we acquire with age no treatment needed  History of skin cancer in no recurrence nothing else atypical sunblock recommended follow-up p r n    Screening for dermatologic disorders nothing else of concern noted on complete exam follow-up in PRN lesion on the right cheek may be suspicious for an early actinic/squamous cell carcinoma if any further growth or concerns raised the let me know